# Patient Record
Sex: FEMALE | Race: WHITE | NOT HISPANIC OR LATINO | Employment: OTHER | URBAN - METROPOLITAN AREA
[De-identification: names, ages, dates, MRNs, and addresses within clinical notes are randomized per-mention and may not be internally consistent; named-entity substitution may affect disease eponyms.]

---

## 2023-07-19 PROBLEM — N28.1 RENAL CYST: Status: ACTIVE | Noted: 2023-07-19

## 2023-07-19 PROBLEM — R31.29 MICROSCOPIC HEMATURIA: Status: ACTIVE | Noted: 2023-07-19

## 2023-07-19 PROBLEM — N28.89 RENAL MASS: Status: ACTIVE | Noted: 2023-07-19

## 2024-01-23 PROBLEM — G89.29 OTHER CHRONIC PAIN: Status: ACTIVE | Noted: 2024-01-23

## 2024-01-23 PROBLEM — E78.5 HLD (HYPERLIPIDEMIA): Status: ACTIVE | Noted: 2024-01-23

## 2024-01-23 PROBLEM — E03.9 THYROID ACTIVITY DECREASED: Status: ACTIVE | Noted: 2024-01-23

## 2024-01-23 PROBLEM — I10 HIGH BLOOD PRESSURE: Status: ACTIVE | Noted: 2024-01-23

## 2024-01-23 PROBLEM — F41.9 ANXIETY: Status: ACTIVE | Noted: 2024-01-23

## 2024-01-26 ENCOUNTER — TELEPHONE (OUTPATIENT)
Dept: ADMINISTRATIVE | Facility: CLINIC | Age: 69
End: 2024-01-26

## 2024-01-26 NOTE — PROGRESS NOTES
"Phoned patient in response to reply of "2" to post-discharge texting tracker. Ms. Lei states that she was not given any instructions on when bandages should be removed. She states the bandages are still in place. Upon review, bandage removal instructions were not included in discharge summary. Sent message to discharging provider with Ms. Lei's question regarding when the bandages may be removed, but have not received a reply. Also, PCP [facility] and cannot be messaged on patient's behalf.  nurse Gema will place a follow up call to speak with Ms. Lei. Ms. Lei verbalized understanding.  The provider replied that the bandages may be removed after 48 hours post-op. Phoned Ms. Lei to give her the doctor's advice. Ms. Lei verbalized understanding. Also gave the OOC nurse line to call if she should have any other questions, concerns or symptoms.  "

## 2024-04-02 PROBLEM — Z85.528 HISTORY OF KIDNEY CANCER: Status: ACTIVE | Noted: 2024-04-02
